# Patient Record
(demographics unavailable — no encounter records)

---

## 2022-09-06 NOTE — NUR
SHIFT SUMMARY
 
PATIENT ADMITTED FROM ER AT 1420. PATIENT SETTLED INTO ROOM. PATIENT ORIENTED
TO CALL LIGHT AND TV CONTROL. PATIENT HAD FRIEND AT BEDSIDE FOR ADMISSION.
PATIENT DENIES PAIN, NAUSEA, AND SHORTNESS OF BREATH. PATIENT IS A [employer]. PATIENT IS A&OX4. PATIENT ADMITTED FOR SEPSIS, POSSIBLE SOURCE UTI.
PATIENT FRIEND BROUGHT PATIENT TACOS, PATIENT ATE THEM. PATIENT REPORTS WEIGHT
LOSS AND DECREASED APPETITE. PATIENT DID NOT KNOW EVERYTHING THAT HE TAKES.
ATTEMPTED TO CALL HIS PHARMACY TWICE AND IT JUST CONTINUOUSLY RANG. WILL PASS
ON TO NIGHTSHIFT. PATIENT IS EATING AND DRINKING WELL. PATIENT IS PLEASANT AND
COOPERATIVE WITH CARE.

## 2022-09-07 NOTE — NUR
SHIFT SUMMARY:
 
PT A&O AND PLEASANT RESTING IN BED. PT TRANSFERED OUT OF BED TO TAKE A SHOWER.
PT INDEPENDANT DURING SHOWER. PT HAD NO COMPLAINTS OF PAIN OR DISCOMFORT. PT
HAD A VISITER FROM A FRIEND TODAY. PT HAS CARE MANAGEMENT TEAM WORKING ON HIS
PLAN OF CARE. PT RESTING IN BED WITH CALL LIGHT WITHIN REACH.

## 2022-09-07 NOTE — NUR
SHIFT SUMMARY:  PT IS ALERT AND ORIENTED.  PT IS CALM AND COOPERATIVE WITH
CARE.  PT CALLS APPROPRIATELY.  PT IS INDEPENDENT IN THE ROOM, USING THE
URINAL IN BED.  2L NS GIVEN AS ORDERED, NOW SALINE LOCKED.  PT DENIES PAIN,
NAUSEA, VOMITING, AND SOB.  PT SLEPT MUCH OF THE NIGHT.  NO CHANGES OR
COMPLICATIONS THIS SHIFT.  BED IN LOW POSITION, CALL LIGHT WITHIN REACH.  WILL
CONTINUE TO MONITOR.

## 2022-09-08 NOTE — NUR
SHIFT SUMMARY-
PT ALERT AND ORIENTED, 1PA TO THE BATHROOM. PT HAS HAD NO ACUTE CHANGES T/O
THE DAY. PT STATED THIS IS THE BEST SLEEP HE HAS HAD IN A LONG TIME, AS HE HAS
BEEN SLEEPING OFF AND ON T/O THE SHIFT. PT CURRENTLY IN BED, CALL LIGHT IN
REACH NO S&S OF DISTRESS NOTED WILL CTM AND PASS ON TO NIGHT RN IN BEDSIDE
REPORT.

## 2022-09-08 NOTE — NUR
PT IS A/OX4, PLEASANT AND COOPERATIVE,  PT APPEARS TO BE BREATHING EASILY ON
RA AT THIS TIME. THE PT WAS MEDICATED FOR SUDDEN ONSET OF ABD PAIN X1. PAIN
RESOLVED. THE PT SLEPT OFF AND ON T/O THE NIGHT. PT IS NOW AWAKE. FAMILY
BROUGHT IN BREAKFAST FOR THE PT PER HIS REQUEST. CALL LIGHT IN REACH, WILL
CONTINUE TO MONITOR AND ASSESS FOR CHANGES

## 2022-09-09 NOTE — NUR
PT IS A/OX4, PLEASANT AND COOPERATIVE. PT IS UP IND IN HIS ROOM . THE PT WAS
IN BED T/O THE NOC SHIFT SOFAR. THE PT DENIED ANY PAIN T/O THE SHIFT SO FAR.
THE PT APEEARED TO HAVE SLEPT WELL T/O THE NIGHT. THE APPEARS TO BE BREATHING
EASILY ON RA AT THIS TIME. VSS. CALL LIGHT IN REACH, WILL CONTINUE TO MONITOR
AND ASSESS FOR CHANGES